# Patient Record
Sex: FEMALE | Race: WHITE
[De-identification: names, ages, dates, MRNs, and addresses within clinical notes are randomized per-mention and may not be internally consistent; named-entity substitution may affect disease eponyms.]

---

## 2018-01-16 ENCOUNTER — HOSPITAL ENCOUNTER (EMERGENCY)
Dept: HOSPITAL 95 - ER | Age: 63
LOS: 1 days | Discharge: HOME | End: 2018-01-17
Payer: COMMERCIAL

## 2018-01-16 VITALS — BODY MASS INDEX: 37.22 KG/M2 | HEIGHT: 70 IN | WEIGHT: 259.99 LBS

## 2018-01-16 DIAGNOSIS — Z79.899: ICD-10-CM

## 2018-01-16 DIAGNOSIS — Z79.82: ICD-10-CM

## 2018-01-16 DIAGNOSIS — E11.9: ICD-10-CM

## 2018-01-16 DIAGNOSIS — Z79.2: ICD-10-CM

## 2018-01-16 DIAGNOSIS — J45.909: ICD-10-CM

## 2018-01-16 DIAGNOSIS — M54.40: Primary | ICD-10-CM

## 2018-01-16 DIAGNOSIS — I25.2: ICD-10-CM

## 2018-01-16 DIAGNOSIS — Z87.891: ICD-10-CM

## 2019-07-11 NOTE — NUR
[facility] HERE FOR TRANSPORT.
PAPERS AND DISC TAKEN WITH PT ALONG WITH ALL BELONGINGS. PT VSS UPON
DEPARTURE.

## 2019-07-11 NOTE — NUR
ARRIVAL TO ICU
PT. ARRIVES TO ICU AT THIS TIME. PT ALERT AND ORIENTED UPON ARRIVAL.
PT. DENIES ANY CHEST PAIN AT THIS TIME. HAS NITRO PATCH IN PLACE. PT. PALE IN
COLOR, DIAPHORETIC. PT. VSS UPON ARRIVAL TO ICU. PT. REPORTS CHEST PAIN WHEN
LAYING FLAT, AND WITH EXCERTION. PT. CURRENTLY ON 2LNC AT THIS TIME, REPORTS
AT HOME SHE DOES NOT WEAR O2, SPO2 98%. PT. HR 80S, PT PLACED ON CARDIAC
MONITOR UPON ARRIVAL. DEFIB PADS REMAIN IN PLACE. PT HAS TR BAND IN PLACE TO
RIGHT WRIST. 11CC OF AIR IN BAND AT THIS TIME. SITE STABLE. PT EDUCATED ON USE
OF ARM. CALL LIGHT IN REACH, BED IN LOW POSITION.

## 2019-07-11 NOTE — NUR
CALL TO [place] FOR REPORT
REPORT GIVEN TO DAVID PHAM. PT. TO BE TRANSPORTED ON [facility] PER DR. MOSES DRAKE. MAP AND DIRECTIONS GIVEN TO PT  ALONG WITH ROOM NUMBER.

## 2019-11-03 NOTE — NUR
ECHO COMPLETED, DR. MORALES IN TO SEE PT AND DISCUSS PLAN OF CARE. PLAN IS TO
COBRA TRANSFER TO [facility] WHEN AVAILABLE. DR. MORALES THAT THE PT DOES NOT
NEED TO TRANSFER TO HIGHER LEVEL OF CARE AT THIS TIME.

## 2019-11-03 NOTE — NUR
SHIFT SUMMARY:
ER ADMIT AT 2250. A/O. INDEPENDENT TO BSC USING FWW. VERY SOB WITH ANY
EXERTION. CURRENTLY ON 2L VIA NC. BASELINE IS RA. 40 MG IV LASIX GIVEN AT
ADMIT FOR A BNP , URINE OUTPUT 925. RECEIVED BREATHING TREATMENTS AND
STATES EASIER TO BREATHE AFTERWARDS. SOME CHRONIC PAIN ALL OVER. WILL CONTINUE
TO MONITOR AND PROVIDE CARE UNTIL SHIFT REPORT TO ONCOMING NURSE.

## 2019-11-03 NOTE — NUR
PT STATES THAT AFTER HAVING THE BREATHING TREATMENT GIVEN TO HER EARLIER IT
SEEMED TO HELP BUT IT DIDN'T LAST VERY LONG. STATES SHE'S STILL PRETTY SOB.
NOW ON 2L VIA NC. WILL MONITOR.

## 2019-11-03 NOTE — NUR
1335 PT COBRA TRANSFERED [facility]IA [facility] TO [facility]. PT SELF
TRANSFERRED TO [facility] WITHOUT ISSUE. 20G IV R FA PATENT WITH DRESSING INTACT,
SL.  AT BEDSIDE AT TIME OF TRANSFER. TRANSFER PACKET GIVEN TO AMBULENCE
. BELONGINGS WITH PT.
9833 REPORT CALLED TO ANKIT GARCIA.

## 2019-11-03 NOTE — NUR
PT STATES STILL SO SOB WHILE AT REST, RT IN TO GIVE PT A BREATHING TREATMENT.
WILL MONITOR. CALL LT IN REACH.